# Patient Record
Sex: FEMALE | HISPANIC OR LATINO | Employment: FULL TIME | URBAN - METROPOLITAN AREA
[De-identification: names, ages, dates, MRNs, and addresses within clinical notes are randomized per-mention and may not be internally consistent; named-entity substitution may affect disease eponyms.]

---
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
## 2023-02-28 ENCOUNTER — OFFICE VISIT (OUTPATIENT)
Dept: URGENT CARE | Facility: CLINIC | Age: 18
End: 2023-02-28
Payer: MEDICAID

## 2023-02-28 ENCOUNTER — HOSPITAL ENCOUNTER (EMERGENCY)
Facility: MEDICAL CENTER | Age: 18
End: 2023-02-28
Attending: EMERGENCY MEDICINE
Payer: MEDICAID

## 2023-02-28 VITALS
OXYGEN SATURATION: 99 % | RESPIRATION RATE: 17 BRPM | TEMPERATURE: 98.8 F | DIASTOLIC BLOOD PRESSURE: 82 MMHG | WEIGHT: 229.94 LBS | HEART RATE: 109 BPM | HEIGHT: 66 IN | SYSTOLIC BLOOD PRESSURE: 121 MMHG | BODY MASS INDEX: 36.95 KG/M2

## 2023-02-28 VITALS
HEART RATE: 131 BPM | TEMPERATURE: 98 F | OXYGEN SATURATION: 98 % | DIASTOLIC BLOOD PRESSURE: 74 MMHG | RESPIRATION RATE: 14 BRPM | BODY MASS INDEX: 38.15 KG/M2 | HEIGHT: 65 IN | WEIGHT: 229 LBS | SYSTOLIC BLOOD PRESSURE: 126 MMHG

## 2023-02-28 DIAGNOSIS — R00.0 TACHYCARDIA: ICD-10-CM

## 2023-02-28 DIAGNOSIS — R42 DIZZINESS: ICD-10-CM

## 2023-02-28 DIAGNOSIS — N92.6 ABNORMAL MENSES: ICD-10-CM

## 2023-02-28 DIAGNOSIS — R10.9 ABDOMINAL CRAMPS: ICD-10-CM

## 2023-02-28 LAB
ALBUMIN SERPL BCP-MCNC: 4.4 G/DL (ref 3.2–4.9)
ALBUMIN/GLOB SERPL: 1.4 G/DL
ALP SERPL-CCNC: 81 U/L (ref 30–99)
ALT SERPL-CCNC: 156 U/L (ref 2–50)
ANION GAP SERPL CALC-SCNC: 14 MMOL/L (ref 7–16)
APPEARANCE UR: CLEAR
APPEARANCE UR: CLEAR
AST SERPL-CCNC: 63 U/L (ref 12–45)
BASOPHILS # BLD AUTO: 0.5 % (ref 0–1.8)
BASOPHILS # BLD: 0.05 K/UL (ref 0–0.12)
BILIRUB SERPL-MCNC: 0.7 MG/DL (ref 0.1–1.5)
BILIRUB UR QL STRIP.AUTO: NEGATIVE
BILIRUB UR STRIP-MCNC: NEGATIVE MG/DL
BUN SERPL-MCNC: 14 MG/DL (ref 8–22)
CALCIUM ALBUM COR SERPL-MCNC: 8.6 MG/DL (ref 8.5–10.5)
CALCIUM SERPL-MCNC: 8.9 MG/DL (ref 8.5–10.5)
CHLORIDE SERPL-SCNC: 101 MMOL/L (ref 96–112)
CO2 SERPL-SCNC: 21 MMOL/L (ref 20–33)
COLOR UR AUTO: NORMAL
COLOR UR: YELLOW
CREAT SERPL-MCNC: 0.47 MG/DL (ref 0.5–1.4)
EOSINOPHIL # BLD AUTO: 0.22 K/UL (ref 0–0.51)
EOSINOPHIL NFR BLD: 2.2 % (ref 0–6.9)
ERYTHROCYTE [DISTWIDTH] IN BLOOD BY AUTOMATED COUNT: 39.4 FL (ref 35.9–50)
GFR SERPLBLD CREATININE-BSD FMLA CKD-EPI: 140 ML/MIN/1.73 M 2
GLOBULIN SER CALC-MCNC: 3.1 G/DL (ref 1.9–3.5)
GLUCOSE SERPL-MCNC: 118 MG/DL (ref 65–99)
GLUCOSE UR STRIP.AUTO-MCNC: NEGATIVE MG/DL
GLUCOSE UR STRIP.AUTO-MCNC: NEGATIVE MG/DL
HCG SERPL QL: NEGATIVE
HCT VFR BLD AUTO: 42 % (ref 37–47)
HGB BLD-MCNC: 14.7 G/DL (ref 12–16)
IMM GRANULOCYTES # BLD AUTO: 0.04 K/UL (ref 0–0.11)
IMM GRANULOCYTES NFR BLD AUTO: 0.4 % (ref 0–0.9)
KETONES UR STRIP.AUTO-MCNC: 15 MG/DL
KETONES UR STRIP.AUTO-MCNC: ABNORMAL MG/DL
LEUKOCYTE ESTERASE UR QL STRIP.AUTO: NEGATIVE
LEUKOCYTE ESTERASE UR QL STRIP.AUTO: NEGATIVE
LIPASE SERPL-CCNC: 14 U/L (ref 11–82)
LYMPHOCYTES # BLD AUTO: 0.95 K/UL (ref 1–4.8)
LYMPHOCYTES NFR BLD: 9.6 % (ref 22–41)
MCH RBC QN AUTO: 30.3 PG (ref 27–33)
MCHC RBC AUTO-ENTMCNC: 35 G/DL (ref 33.6–35)
MCV RBC AUTO: 86.6 FL (ref 81.4–97.8)
MICRO URNS: ABNORMAL
MONOCYTES # BLD AUTO: 0.67 K/UL (ref 0–0.85)
MONOCYTES NFR BLD AUTO: 6.8 % (ref 0–13.4)
NEUTROPHILS # BLD AUTO: 7.95 K/UL (ref 2–7.15)
NEUTROPHILS NFR BLD: 80.5 % (ref 44–72)
NITRITE UR QL STRIP.AUTO: NEGATIVE
NITRITE UR QL STRIP.AUTO: NEGATIVE
NRBC # BLD AUTO: 0 K/UL
NRBC BLD-RTO: 0 /100 WBC
PH UR STRIP.AUTO: 6 [PH] (ref 5–8)
PH UR STRIP.AUTO: 6 [PH] (ref 5–8)
PLATELET # BLD AUTO: 307 K/UL (ref 164–446)
PMV BLD AUTO: 9.8 FL (ref 9–12.9)
POCT INT CON NEG: NEGATIVE
POCT INT CON POS: POSITIVE
POCT URINE PREGNANCY TEST: NEGATIVE
POTASSIUM SERPL-SCNC: 4 MMOL/L (ref 3.6–5.5)
PROT SERPL-MCNC: 7.5 G/DL (ref 6–8.2)
PROT UR QL STRIP: NEGATIVE MG/DL
PROT UR QL STRIP: NEGATIVE MG/DL
RBC # BLD AUTO: 4.85 M/UL (ref 4.2–5.4)
RBC UR QL AUTO: NEGATIVE
RBC UR QL AUTO: NEGATIVE
SODIUM SERPL-SCNC: 136 MMOL/L (ref 135–145)
SP GR UR STRIP.AUTO: 1.02
SP GR UR STRIP.AUTO: 1.02
UROBILINOGEN UR STRIP-MCNC: 0.2 MG/DL
UROBILINOGEN UR STRIP.AUTO-MCNC: 0.2 MG/DL
WBC # BLD AUTO: 9.9 K/UL (ref 4.8–10.8)

## 2023-02-28 PROCEDURE — 36415 COLL VENOUS BLD VENIPUNCTURE: CPT

## 2023-02-28 PROCEDURE — 80053 COMPREHEN METABOLIC PANEL: CPT

## 2023-02-28 PROCEDURE — 99203 OFFICE O/P NEW LOW 30 MIN: CPT | Performed by: NURSE PRACTITIONER

## 2023-02-28 PROCEDURE — 700105 HCHG RX REV CODE 258: Performed by: EMERGENCY MEDICINE

## 2023-02-28 PROCEDURE — 85025 COMPLETE CBC W/AUTO DIFF WBC: CPT

## 2023-02-28 PROCEDURE — 83690 ASSAY OF LIPASE: CPT

## 2023-02-28 PROCEDURE — 84703 CHORIONIC GONADOTROPIN ASSAY: CPT

## 2023-02-28 PROCEDURE — 81003 URINALYSIS AUTO W/O SCOPE: CPT

## 2023-02-28 PROCEDURE — 96374 THER/PROPH/DIAG INJ IV PUSH: CPT

## 2023-02-28 PROCEDURE — 99285 EMERGENCY DEPT VISIT HI MDM: CPT

## 2023-02-28 PROCEDURE — 81025 URINE PREGNANCY TEST: CPT | Performed by: NURSE PRACTITIONER

## 2023-02-28 PROCEDURE — 81002 URINALYSIS NONAUTO W/O SCOPE: CPT | Performed by: NURSE PRACTITIONER

## 2023-02-28 PROCEDURE — 700111 HCHG RX REV CODE 636 W/ 250 OVERRIDE (IP): Performed by: EMERGENCY MEDICINE

## 2023-02-28 RX ORDER — ONDANSETRON 4 MG/1
4 TABLET, ORALLY DISINTEGRATING ORAL ONCE
Status: COMPLETED | OUTPATIENT
Start: 2023-02-28 | End: 2023-02-28

## 2023-02-28 RX ORDER — SODIUM CHLORIDE 9 MG/ML
1000 INJECTION, SOLUTION INTRAVENOUS ONCE
Status: COMPLETED | OUTPATIENT
Start: 2023-02-28 | End: 2023-02-28

## 2023-02-28 RX ORDER — PROCHLORPERAZINE EDISYLATE 5 MG/ML
10 INJECTION INTRAMUSCULAR; INTRAVENOUS ONCE
Status: COMPLETED | OUTPATIENT
Start: 2023-02-28 | End: 2023-02-28

## 2023-02-28 RX ADMIN — ONDANSETRON 4 MG: 4 TABLET, ORALLY DISINTEGRATING ORAL at 17:16

## 2023-02-28 RX ADMIN — PROCHLORPERAZINE EDISYLATE 10 MG: 5 INJECTION INTRAMUSCULAR; INTRAVENOUS at 19:30

## 2023-02-28 RX ADMIN — SODIUM CHLORIDE 1000 ML: 9 INJECTION, SOLUTION INTRAVENOUS at 18:19

## 2023-02-28 RX ADMIN — SODIUM CHLORIDE 1000 ML: 9 INJECTION, SOLUTION INTRAVENOUS at 17:17

## 2023-02-28 ASSESSMENT — PAIN DESCRIPTION - PAIN TYPE: TYPE: ACUTE PAIN

## 2023-02-28 NOTE — ED NOTES
Pt ambulatory to yellow 58 with steady gait. Pt changed into gown and placed on monitor. Chart up for ERP.

## 2023-02-28 NOTE — ED TRIAGE NOTES
Chief Complaint   Patient presents with    Sent by MD     Pt was sent from  for dizziness, numbness in hands, abdominal cramping, and nausea x1 month. Pt states she has a Hx of iron deficiency anemia.     Pt educated upon triage process and told to inform  staff of any changes in condition so that Pt may be reassessed. No further questions at this time. Pt sitting out in lobby.

## 2023-02-28 NOTE — PROGRESS NOTES
Chief Complaint   Patient presents with    Abdominal Pain     Abd pain/cramps, hands numb, dizziness, nausea, H/O anemia, seeing pcp 3/29/2023       HISTORY OF PRESENT ILLNESS: Patient is a pleasant 17 y.o. female, with a history of anemia, who presents to urgent care today with complaints of dizziness.  She had an episode of dizziness along with hand cramping this morning.  She has had intermittent dizziness and abdominal cramps over the past month which have been worsening.  She notes history of abnormal menses with concern of anemia.  She has not been worked up for such.    There are no problems to display for this patient.      Allergies:Patient has no known allergies.    No current Epic-ordered outpatient medications on file.     No current Epic-ordered facility-administered medications on file.       History reviewed. No pertinent past medical history.    Social History     Tobacco Use    Smoking status: Never    Smokeless tobacco: Never   Vaping Use    Vaping Use: Never used   Substance Use Topics    Alcohol use: Never    Drug use: Never       No family status information on file.   History reviewed. No pertinent family history.    ROS:  Review of Systems   Constitutional: Negative for fever, chills, weight loss, malaise, and fatigue.   HENT: Negative for ear pain, nosebleeds, congestion, sore throat and neck pain.    Eyes: Negative for vision changes.   Neuro: Positive for dizziness.  Negative for headache, sensory changes, weakness, seizure, LOC.   Cardiovascular: Negative for chest pain, palpitations, orthopnea and leg swelling.   Respiratory: Negative for cough, sputum production, shortness of breath and wheezing.   Gastrointestinal: Positive for abdominal cramping, nausea.   Negative for vomiting or diarrhea.   Genitourinary: Negative for dysuria, urgency and frequency.  Musculoskeletal:   Positive for hand cramping.  Negative for falls, neck pain, back pain, joint pain, myalgias.   Skin: Negative for  "rash, diaphoresis.     Exam:  /74   Pulse (!) 131   Temp 36.7 °C (98 °F) (Temporal)   Resp 14   Ht 1.651 m (5' 5\")   Wt 104 kg (229 lb)   SpO2 98%   General: well-nourished, well-developed female in NAD  Head: normocephalic, atraumatic  Eyes: PERRLA, no conjunctival injection, acuity grossly intact, lids normal.  Ears: normal shape and symmetry, no tenderness, no discharge. External canals are without any significant edema or erythema. Tympanic membranes are without any inflammation, no effusion. Gross auditory acuity is intact.  Nose: symmetrical without tenderness, no discharge.  Mouth/Throat: reasonable hygiene, no erythema, exudates or tonsillar enlargement.  Neck: no masses, range of motion within normal limits, no tracheal deviation. No obvious thyroid enlargement.   Lymph: no cervical adenopathy. No supraclavicular adenopathy.   Neuro: alert and oriented. Cranial nerves 1-12 grossly intact. No sensory deficit.   Cardiovascular: Tachycardic rate and regular rhythm. No edema.  Pulmonary: no distress. Chest is symmetrical with respiration, no wheezes, crackles, or rhonchi.   Abdomen: soft, no distinct tenderness, no guarding, no hepatosplenomegaly.  Musculoskeletal: no clubbing, appropriate muscle tone, gait is stable.  Skin: warm, dry, intact, no clubbing, no cyanosis, no rashes.   Psych: appropriate mood, affect, judgement.       POC urine positive for ketones    POC pregnancy negative      Assessment/Plan:  1. Dizziness        2. Tachycardia        3. Abdominal cramps  POCT Urinalysis    POCT Pregnancy      4. Abnormal menses              The patient is a 17-year-old female who presents with a sudden episode of dizziness and hand tingling today.  She notes she has had been having intermittent dizziness and nausea along with abdominal cramping and abnormal menses over the last month, worsening recently.  Urine negative today for pregnancy.  Upon examination patient is tachycardic at 131. " Differential diagnoses include but are not limited to: Anemia, abnormal uterine bleeding, electrolyte imbalance.  At this time, I feel the patient requires a higher level of care in the ED for closer monitoring, stat lab work and/or imaging for further evaluation. This has been discussed with the patient and she states agreement and understanding. The patient is stable to leave POV at this time and will go directly to ED without delay. Instructed to remain NPO, patient has a ride.         Please note that this dictation was created using voice recognition software. I have made every reasonable attempt to correct obvious errors, but I expect that there are errors of grammar and possibly content that I did not discover before finalizing the note.      HUGO Patrick.

## 2023-02-28 NOTE — Clinical Note
Katie Demarco was seen and treated in our emergency department on 2/28/2023.  She may return to work on 03/02/2023.       If you have any questions or concerns, please don't hesitate to call.      Alejo Rivera D.O.

## 2023-03-01 NOTE — ED PROVIDER NOTES
"ED Provider Note    CHIEF COMPLAINT  Chief Complaint   Patient presents with    Sent by MD     Pt was sent from  for dizziness, numbness in hands, abdominal cramping, and nausea x1 month. Pt states she has a Hx of iron deficiency anemia.       EXTERNAL RECORDS REVIEWED  None    HPI/ROS  LIMITATION TO HISTORY   None  OUTSIDE HISTORIAN(S):  None    Katie Demarco is a 17 y.o. female who presents here for evaluation of dizziness and upper abdominal cramping.  Patient states that her cramping is around her entire abdomen, consistent with her menses cramping.  She has no fever chills, but does complain of some vomiting earlier today.  She last threw up around 11:30 AM.  She states that she is able to drink fluids since that time.  She had some bilateral hand numbness and tingling, that is since resolved.  She states that she is worse when she gets up, and that her dizziness happens.  It does then resolve after she lays back down.    PAST MEDICAL HISTORY   No bleeding disorders    SURGICAL HISTORY  patient denies any surgical history    FAMILY HISTORY  No family history on file.    SOCIAL HISTORY  Social History     Tobacco Use    Smoking status: Never    Smokeless tobacco: Never   Vaping Use    Vaping Use: Never used   Substance and Sexual Activity    Alcohol use: Never    Drug use: Never    Sexual activity: Not on file       CURRENT MEDICATIONS  Home Medications    **Home medications have not yet been reviewed for this encounter**         ALLERGIES  No Known Allergies    PHYSICAL EXAM  VITAL SIGNS: /82   Pulse (!) 109   Temp 37.1 °C (98.8 °F) (Temporal)   Resp 17   Ht 1.676 m (5' 6\")   Wt 104 kg (229 lb 15 oz)   LMP 02/21/2023 (Approximate)   SpO2 99%   BMI 37.11 kg/m²    Constitutional: Well developed, well nourished. No acute distress.  HEENT: Normocephalic, atraumatic. Posterior pharynx clear and moist.  Eyes:  EOMI. Normal sclera.  Neck: Supple, Full range of motion, " nontender.  Chest/Pulmonary: clear to ausculation. Symmetrical expansion.   Cardio: Regular rate and rhythm with no murmur.   Abdomen: Soft, nontender. No peritoneal signs. No guarding. No palpable masses.  Musculoskeletal: No deformity, no edema, neurovascular intact.   Neuro: Clear speech, appropriate, cooperative, cranial nerves II-XII grossly intact.  Psych: Normal mood and affect      DIAGNOSTIC STUDIES / PROCEDURES  Results for orders placed or performed during the hospital encounter of 02/28/23   CBC WITH DIFFERENTIAL   Result Value Ref Range    WBC 9.9 4.8 - 10.8 K/uL    RBC 4.85 4.20 - 5.40 M/uL    Hemoglobin 14.7 12.0 - 16.0 g/dL    Hematocrit 42.0 37.0 - 47.0 %    MCV 86.6 81.4 - 97.8 fL    MCH 30.3 27.0 - 33.0 pg    MCHC 35.0 33.6 - 35.0 g/dL    RDW 39.4 35.9 - 50.0 fL    Platelet Count 307 164 - 446 K/uL    MPV 9.8 9.0 - 12.9 fL    Neutrophils-Polys 80.50 (H) 44.00 - 72.00 %    Lymphocytes 9.60 (L) 22.00 - 41.00 %    Monocytes 6.80 0.00 - 13.40 %    Eosinophils 2.20 0.00 - 6.90 %    Basophils 0.50 0.00 - 1.80 %    Immature Granulocytes 0.40 0.00 - 0.90 %    Nucleated RBC 0.00 /100 WBC    Neutrophils (Absolute) 7.95 (H) 2.00 - 7.15 K/uL    Lymphs (Absolute) 0.95 (L) 1.00 - 4.80 K/uL    Monos (Absolute) 0.67 0.00 - 0.85 K/uL    Eos (Absolute) 0.22 0.00 - 0.51 K/uL    Baso (Absolute) 0.05 0.00 - 0.12 K/uL    Immature Granulocytes (abs) 0.04 0.00 - 0.11 K/uL    NRBC (Absolute) 0.00 K/uL   COMP METABOLIC PANEL   Result Value Ref Range    Sodium 136 135 - 145 mmol/L    Potassium 4.0 3.6 - 5.5 mmol/L    Chloride 101 96 - 112 mmol/L    Co2 21 20 - 33 mmol/L    Anion Gap 14.0 7.0 - 16.0    Glucose 118 (H) 65 - 99 mg/dL    Bun 14 8 - 22 mg/dL    Creatinine 0.47 (L) 0.50 - 1.40 mg/dL    Calcium 8.9 8.5 - 10.5 mg/dL    AST(SGOT) 63 (H) 12 - 45 U/L    ALT(SGPT) 156 (H) 2 - 50 U/L    Alkaline Phosphatase 81 30 - 99 U/L    Total Bilirubin 0.7 0.1 - 1.5 mg/dL    Albumin 4.4 3.2 - 4.9 g/dL    Total Protein 7.5 6.0 - 8.2  g/dL    Globulin 3.1 1.9 - 3.5 g/dL    A-G Ratio 1.4 g/dL   LIPASE   Result Value Ref Range    Lipase 14 11 - 82 U/L   HCG QUAL SERUM   Result Value Ref Range    Beta-Hcg Qualitative Serum Negative Negative   URINALYSIS,CULTURE IF INDICATED    Specimen: Urine   Result Value Ref Range    Color Yellow     Character Clear     Specific Gravity 1.018 <1.035    Ph 6.0 5.0 - 8.0    Glucose Negative Negative mg/dL    Ketones Trace (A) Negative mg/dL    Protein Negative Negative mg/dL    Bilirubin Negative Negative    Urobilinogen, Urine 0.2 Negative    Nitrite Negative Negative    Leukocyte Esterase Negative Negative    Occult Blood Negative Negative    Micro Urine Req see below    CORRECTED CALCIUM   Result Value Ref Range    Correct Calcium 8.6 8.5 - 10.5 mg/dL   ESTIMATED GFR   Result Value Ref Range    GFR (CKD-EPI) 140 >60 mL/min/1.73 m 2         RADIOLOGY          COURSE & MEDICAL DECISION MAKING      INITIAL ASSESSMENT, COURSE AND PLAN  Care Narrative: This is a 17-year-old female here for evaluation of dizziness.  Patient states that she has had a day of vomiting, and states that every time she gets up she becomes nauseated and starts to vomit.  She has been worked up here, blood work wise comfortable going home.  She has been given 2 L of IV fluid, and she was positive for orthostatics.  Patient does feel better, and has been up walking around.  She is comfortable with outpatient follow-up.    DISPOSITION AND DISCUSSIONS  I have discussed management of the patient with the following physicians and ROLANDO's:  none    Discussion of management with other QHP or appropriate source(s): None     Escalation of care considered, and ultimately not performed:none    Barriers to care at this time, including but not limited to: Patient does not have established PCP.     Decision tools and prescription drugs considered including, but not limited to: .    FINAL DIAGNOSIS  1. Dizziness           Electronically signed by: Alejo LURICH  HUI Rivera, 2/28/2023 4:25 PM

## 2023-03-01 NOTE — ED NOTES
Pt ambulated to the restroom with steady gait and without assistance. Returned to bed and re-placed on monitors. No further complaints at this time.

## 2024-02-21 ENCOUNTER — OFFICE VISIT (OUTPATIENT)
Dept: URGENT CARE | Facility: CLINIC | Age: 19
End: 2024-02-21
Payer: MEDICAID

## 2024-02-21 VITALS
WEIGHT: 174 LBS | OXYGEN SATURATION: 98 % | BODY MASS INDEX: 28.99 KG/M2 | HEIGHT: 65 IN | SYSTOLIC BLOOD PRESSURE: 108 MMHG | DIASTOLIC BLOOD PRESSURE: 60 MMHG | HEART RATE: 102 BPM | TEMPERATURE: 97.4 F | RESPIRATION RATE: 20 BRPM

## 2024-02-21 DIAGNOSIS — J02.9 PHARYNGITIS, UNSPECIFIED ETIOLOGY: ICD-10-CM

## 2024-02-21 DIAGNOSIS — J06.9 VIRAL URI WITH COUGH: ICD-10-CM

## 2024-02-21 LAB
FLUAV RNA SPEC QL NAA+PROBE: NEGATIVE
FLUBV RNA SPEC QL NAA+PROBE: NEGATIVE
RSV RNA SPEC QL NAA+PROBE: NEGATIVE
SARS-COV-2 RNA RESP QL NAA+PROBE: NEGATIVE

## 2024-02-21 PROCEDURE — 99213 OFFICE O/P EST LOW 20 MIN: CPT | Performed by: PHYSICIAN ASSISTANT

## 2024-02-21 PROCEDURE — 3078F DIAST BP <80 MM HG: CPT | Performed by: PHYSICIAN ASSISTANT

## 2024-02-21 PROCEDURE — 3074F SYST BP LT 130 MM HG: CPT | Performed by: PHYSICIAN ASSISTANT

## 2024-02-21 PROCEDURE — 87637 SARSCOV2&INF A&B&RSV AMP PRB: CPT | Mod: QW | Performed by: PHYSICIAN ASSISTANT

## 2024-02-21 RX ORDER — DEXTROMETHORPHAN HYDROBROMIDE AND PROMETHAZINE HYDROCHLORIDE 15; 6.25 MG/5ML; MG/5ML
5 SYRUP ORAL EVERY 4 HOURS PRN
Qty: 120 ML | Refills: 0 | Status: SHIPPED | OUTPATIENT
Start: 2024-02-21 | End: 2024-03-19

## 2024-02-21 ASSESSMENT — ENCOUNTER SYMPTOMS
COUGH: 1
CHILLS: 1
WHEEZING: 0
NAUSEA: 0
HEADACHES: 1
SPUTUM PRODUCTION: 0
SHORTNESS OF BREATH: 0
MYALGIAS: 1
VOMITING: 0
FEVER: 1
DIARRHEA: 0
ABDOMINAL PAIN: 0

## 2024-02-21 ASSESSMENT — FIBROSIS 4 INDEX: FIB4 SCORE: 0.33

## 2024-02-21 NOTE — LETTER
[facility] [place] [id number] [facility] [id number]     February 21, 2024    Patient: Katie Demarco   YOB: 2005   Date of Visit: 2/21/2024       To Whom It May Concern:    Katie Demarco was seen and treated in our department on 2/21/2024.  She should be excused from school for today and tomorrow.    Sincerely,     Bharat Donis P.A.-C.

## 2024-02-21 NOTE — PROGRESS NOTES
"Subjective:   Katie Demarco  is a 18 y.o. female who presents for Fever (On and off fevers since yesterday ), Otalgia, Pharyngitis, Headache (Started symptoms yesterday ), and Cough      Fever   This is a new problem. The current episode started yesterday. Associated symptoms include congestion, coughing, ear pain and headaches. Pertinent negatives include no abdominal pain, diarrhea, nausea, rash, vomiting or wheezing.   Otalgia   Associated symptoms include coughing and headaches. Pertinent negatives include no abdominal pain, diarrhea, rash or vomiting.   Pharyngitis   Associated symptoms include congestion, coughing, ear pain and headaches. Pertinent negatives include no abdominal pain, diarrhea, shortness of breath or vomiting.   Headache  Cough  Associated symptoms include chills, ear pain, a fever, headaches and myalgias. Pertinent negatives include no rash, shortness of breath or wheezing.   Patient presents urgent care noting yesterday with subjective fever onset.  Notes some coughing that irritated throat as well.  Denies ear pain.  Denies vomiting abdominal pain or diarrhea.  Denies history of asthma pneumonia or having tested positive for COVID.  She presume she has had COVID in the past with mild symptoms.  She has tried treatment with an OTC fever reducer.    Review of Systems   Constitutional:  Positive for chills and fever.   HENT:  Positive for congestion and ear pain.    Respiratory:  Positive for cough. Negative for sputum production, shortness of breath and wheezing.    Gastrointestinal:  Negative for abdominal pain, diarrhea, nausea and vomiting.   Musculoskeletal:  Positive for myalgias.   Skin:  Negative for rash.   Neurological:  Positive for headaches.       No Known Allergies     Objective:   /60 (BP Location: Left arm, Patient Position: Sitting)   Pulse (!) 102   Temp 36.3 °C (97.4 °F) (Temporal)   Resp 20   Ht 1.651 m (5' 5\")   Wt 78.9 kg (174 lb)   SpO2 98%   BMI " 28.96 kg/m²     Physical Exam  Vitals and nursing note reviewed.   Constitutional:       General: She is not in acute distress.     Appearance: She is well-developed. She is not diaphoretic.   HENT:      Head: Normocephalic and atraumatic.      Right Ear: Tympanic membrane, ear canal and external ear normal.      Left Ear: Tympanic membrane, ear canal and external ear normal.      Nose: Nose normal.      Mouth/Throat:      Mouth: Mucous membranes are moist.      Pharynx: Uvula midline. Posterior oropharyngeal erythema ( mild PND) present. No oropharyngeal exudate.      Tonsils: No tonsillar abscesses.   Eyes:      General: Lids are normal. No scleral icterus.        Right eye: No discharge.         Left eye: No discharge.      Conjunctiva/sclera: Conjunctivae normal.   Pulmonary:      Effort: Pulmonary effort is normal. No respiratory distress.      Breath sounds: Normal breath sounds. No stridor. No decreased breath sounds, wheezing, rhonchi or rales.   Musculoskeletal:         General: Normal range of motion.      Cervical back: Neck supple.   Skin:     General: Skin is warm and dry.      Coloration: Skin is not pale.      Findings: No erythema.   Neurological:      Mental Status: She is alert and oriented to person, place, and time. She is not disoriented.   Psychiatric:         Speech: Speech normal.         Behavior: Behavior normal.     Point-of-care test for RSV influenza and COVID is all negative    Assessment/Plan:   1. Viral URI with cough  - POCT CoV-2, Flu A/B, RSV by PCR  - promethazine-dextromethorphan (PROMETHAZINE-DM) 6.25-15 MG/5ML syrup; Take 5 mL by mouth every four hours as needed for Cough.  Dispense: 120 mL; Refill: 0    2. Pharyngitis, unspecified etiology  - POCT CoV-2, Flu A/B, RSV by PCR  Supportive care is reviewed with patient/caregiver - recommend to push PO fluids and electrolytes, Cautioned regarding potential for sedation with medication.  OTC supportive care measures reviewed  Return  to clinic with lack of resolution or progression of symptoms.      I have worn an N95 mask, gloves and eye protection for the entire encounter with this patient.     Differential diagnosis, natural history, supportive care, and indications for immediate follow-up discussed.

## 2024-02-29 ENCOUNTER — OFFICE VISIT (OUTPATIENT)
Dept: URGENT CARE | Facility: CLINIC | Age: 19
End: 2024-02-29
Payer: MEDICAID

## 2024-02-29 VITALS
WEIGHT: 175 LBS | OXYGEN SATURATION: 98 % | SYSTOLIC BLOOD PRESSURE: 100 MMHG | TEMPERATURE: 98 F | HEIGHT: 65 IN | HEART RATE: 88 BPM | RESPIRATION RATE: 18 BRPM | DIASTOLIC BLOOD PRESSURE: 68 MMHG | BODY MASS INDEX: 29.16 KG/M2

## 2024-02-29 DIAGNOSIS — J01.90 ACUTE BACTERIAL RHINOSINUSITIS: ICD-10-CM

## 2024-02-29 DIAGNOSIS — B96.89 ACUTE BACTERIAL RHINOSINUSITIS: ICD-10-CM

## 2024-02-29 PROCEDURE — 99213 OFFICE O/P EST LOW 20 MIN: CPT | Performed by: STUDENT IN AN ORGANIZED HEALTH CARE EDUCATION/TRAINING PROGRAM

## 2024-02-29 PROCEDURE — 3078F DIAST BP <80 MM HG: CPT | Performed by: STUDENT IN AN ORGANIZED HEALTH CARE EDUCATION/TRAINING PROGRAM

## 2024-02-29 PROCEDURE — 3074F SYST BP LT 130 MM HG: CPT | Performed by: STUDENT IN AN ORGANIZED HEALTH CARE EDUCATION/TRAINING PROGRAM

## 2024-02-29 RX ORDER — FLUTICASONE PROPIONATE 50 MCG
2 SPRAY, SUSPENSION (ML) NASAL
Qty: 16 G | Refills: 1 | Status: SHIPPED | OUTPATIENT
Start: 2024-02-29 | End: 2024-03-19

## 2024-02-29 RX ORDER — AMOXICILLIN AND CLAVULANATE POTASSIUM 875; 125 MG/1; MG/1
1 TABLET, FILM COATED ORAL 2 TIMES DAILY
Qty: 10 TABLET | Refills: 0 | Status: SHIPPED | OUTPATIENT
Start: 2024-02-29 | End: 2024-03-05

## 2024-02-29 RX ORDER — CETIRIZINE HYDROCHLORIDE 10 MG/1
10 TABLET ORAL DAILY
Qty: 30 TABLET | Refills: 1 | Status: SHIPPED | OUTPATIENT
Start: 2024-02-29 | End: 2024-03-19

## 2024-02-29 ASSESSMENT — FIBROSIS 4 INDEX: FIB4 SCORE: 0.33

## 2024-02-29 NOTE — LETTER
March 1, 2024       Patient: Katie Demarco   YOB: 2005   Date of Visit: 2/29/2024         To Whom It May Concern:    Katie Demarco is excused from school yesterday and today.    If you have any questions or concerns, please don't hesitate to call [phone]          Sincerely,          Clemente Pascal D.O.  Electronically Signed

## 2024-02-29 NOTE — LETTER
February 29, 2024       Patient: Katie Demarco   YOB: 2005   Date of Visit: 2/29/2024         To Whom It May Concern:     Katie Demarco was seen in urgent care and is excuse from school today.    If you have any questions or concerns, please don't hesitate to call [phone]          Sincerely,          Clemente Pascal D.O.  Electronically Signed

## 2024-02-29 NOTE — PROGRESS NOTES
"Subjective:   CHIEF COMPLAINT  Chief Complaint   Patient presents with    Cough     X 2 weeks on and off, cough from \"irritated throat,\" runny nose, eye boogers, red/irritated eyes       [facility]  Katie Demarco is a 18 y.o. female who presents with a chief complaint of sinus congestion for approximately 2 weeks.  Seen in urgent care at onset and diagnosed with a viral URI.  Says congestion has not improved and is now having difficulty sleeping at night due to the congestion.  She has tried cough syrups which have not helped.  Then last night she developed some watery discharge from her eyes and woke up this morning with her eyes matted shut.  Eyes are little red and slightly irritated.  She does not wear contacts.  She has not had any fevers.  No wheezing.  No history of asthma.  She is accompanied by her friend.  Requesting a note for school.    REVIEW OF SYSTEMS  General: no fever or chills  GI: no nausea or vomiting  See HPI for further details.    PAST MEDICAL HISTORY  There are no problems to display for this patient.      SURGICAL HISTORY  patient denies any surgical history    ALLERGIES  No Known Allergies    CURRENT MEDICATIONS  Home Medications    **Home medications have not yet been reviewed for this encounter**         SOCIAL HISTORY  Social History     Tobacco Use    Smoking status: Never    Smokeless tobacco: Never   Vaping Use    Vaping Use: Never used   Substance and Sexual Activity    Alcohol use: Never    Drug use: Never    Sexual activity: Not on file       FAMILY HISTORY  No family history on file.       Objective:   PHYSICAL EXAM  VITAL SIGNS: /68   Pulse 88   Temp 36.7 °C (98 °F) (Temporal)   Resp 18   Ht 1.651 m (5' 5\")   Wt 79.4 kg (175 lb)   SpO2 98%   BMI 29.12 kg/m²     Gen: no acute distress, normal voice  Skin: dry, intact, moist mucosal membranes  Eyes: Mild conjunctival injection b/l; r > l   Neck: Normal range of motion. No meningeal signs.   ENT: Minimal bilateral " tonsillar exudates without any erythema or edema.  Uvula midline.  Lungs: No increased work of breathing.  CTAB w/ symmetric expansion  CV: RRR w/o murmurs or clicks  Psych: normal affect, normal judgement, alert, awake    Assessment/Plan:     1. Acute bacterial rhinosinusitis  amoxicillin-clavulanate (AUGMENTIN) 875-125 MG Tab    fluticasone (FLONASE) 50 MCG/ACT nasal spray    cetirizine (ZYRTEC) 10 MG Tab      History consistent with bacterial sinus infection.  -Ordered Augmentin  -Ordered Flonase and Zyrtec  -Return to urgent care any new/worsening symptoms or further questions or concerns.  Patient understood everything discussed.  All questions were answered.      Differential diagnosis and supportive care discussed. Follow-up as needed if symptoms worsen or fail to improve to PCP, Urgent care or Emergency Room.    Please note that this dictation was created using voice recognition software. I have made a reasonable attempt to correct obvious errors, but I expect that there are errors of grammar and possibly content that I did not discover before finalizing the note.

## 2024-03-19 ENCOUNTER — APPOINTMENT (OUTPATIENT)
Dept: RADIOLOGY | Facility: IMAGING CENTER | Age: 19
End: 2024-03-19
Attending: NURSE PRACTITIONER
Payer: MEDICAID

## 2024-03-19 ENCOUNTER — OFFICE VISIT (OUTPATIENT)
Dept: URGENT CARE | Facility: CLINIC | Age: 19
End: 2024-03-19
Payer: MEDICAID

## 2024-03-19 VITALS
RESPIRATION RATE: 14 BRPM | DIASTOLIC BLOOD PRESSURE: 68 MMHG | HEIGHT: 65 IN | WEIGHT: 177.8 LBS | BODY MASS INDEX: 29.62 KG/M2 | OXYGEN SATURATION: 99 % | SYSTOLIC BLOOD PRESSURE: 110 MMHG | TEMPERATURE: 96.8 F | HEART RATE: 93 BPM

## 2024-03-19 DIAGNOSIS — M65.9 SYNOVITIS OF ELBOW: ICD-10-CM

## 2024-03-19 DIAGNOSIS — M25.522 LEFT ELBOW PAIN: ICD-10-CM

## 2024-03-19 PROCEDURE — 99213 OFFICE O/P EST LOW 20 MIN: CPT | Performed by: NURSE PRACTITIONER

## 2024-03-19 PROCEDURE — 3074F SYST BP LT 130 MM HG: CPT | Performed by: NURSE PRACTITIONER

## 2024-03-19 PROCEDURE — 3078F DIAST BP <80 MM HG: CPT | Performed by: NURSE PRACTITIONER

## 2024-03-19 PROCEDURE — 73080 X-RAY EXAM OF ELBOW: CPT | Mod: TC,LT | Performed by: NURSE PRACTITIONER

## 2024-03-19 RX ORDER — MELOXICAM 7.5 MG/1
7.5 TABLET ORAL DAILY
Qty: 30 TABLET | Refills: 0 | Status: SHIPPED | OUTPATIENT
Start: 2024-03-19

## 2024-03-19 ASSESSMENT — FIBROSIS 4 INDEX: FIB4 SCORE: 0.35

## 2024-03-19 NOTE — PROGRESS NOTES
"Chief Complaint   Patient presents with    Elbow Pain     X2 weeks, no medicine working. Feels cramping pain at night and unable to sleep/or work. \" Feels like a tense pinching\".       HISTORY OF PRESENT ILLNESS: Patient is a pleasant 19 y.o. female who presents to urgent care today with complaints of elbow pain.  The patient notes that for the past 2 weeks she has had pain to her left elbow, primarily with extension.  She does feel some pain in her right elbow as well but not as severe.  Denies any numbness, tingling, weakness.  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  She has tried a few doses of NSAIDs without much improvement.    There are no problems to display for this patient.      Allergies:Patient has no known allergies.    No current [facility]-ordered outpatient medications on file.     No current [facility]-ordered facility-administered medications on file.       History reviewed. No pertinent past medical history.    Social History     Tobacco Use    Smoking status: Never    Smokeless tobacco: Never   Vaping Use    Vaping Use: Never used   Substance Use Topics    Alcohol use: Never    Drug use: Never       No family status information on file.   History reviewed. No pertinent family history.    ROS:  Review of Systems   Constitutional: Negative for fever, chills, weight loss, malaise, and fatigue.   HENT: Negative for ear pain, nosebleeds, congestion, sore throat and neck pain.    Eyes: Negative for vision changes.   Neuro: Negative for headache, sensory changes, weakness, seizure, LOC.   Cardiovascular: Negative for chest pain, palpitations, orthopnea and leg swelling.   Respiratory: Negative for cough, sputum production, shortness of breath and wheezing.   Gastrointestinal: Negative for abdominal pain, nausea, vomiting or diarrhea.   Genitourinary: Negative for dysuria, urgency and frequency.  Musculoskeletal: Positive for bilateral elbow pain, left greater than " "right.  Negative for falls, neck pain, back pain, myalgias.   Skin: Negative for rash, diaphoresis.     Exam:  /68 (BP Location: Left arm, Patient Position: Sitting, BP Cuff Size: Adult)   Pulse 93   Temp 36 °C (96.8 °F) (Temporal)   Resp 14   Ht 1.651 m (5' 5\")   Wt 80.6 kg (177 lb 12.8 oz)   SpO2 99%   General: well-nourished, well-developed female in NAD  Head: normocephalic, atraumatic  Eyes: PERRLA, no conjunctival injection, acuity grossly intact, lids normal.  Ears: normal shape and symmetry, no tenderness, no discharge. External canals are without any significant edema or erythema. Tympanic membranes are without any inflammation, no effusion. Gross auditory acuity is intact.  Nose: symmetrical without tenderness, no discharge.  Mouth/Throat: reasonable hygiene, no erythema, exudates or tonsillar enlargement.  Neck: no masses, range of motion within normal limits, no tracheal deviation. No obvious thyroid enlargement.   Lymph: no cervical adenopathy. No supraclavicular adenopathy.   Neuro: alert and oriented. Cranial nerves 1-12 grossly intact. No sensory deficit.   Cardiovascular: regular rate and rhythm. No edema.  Pulmonary: no distress. Chest is symmetrical with respiration, no wheezes, crackles, or rhonchi.   Musculoskeletal: no clubbing, appropriate muscle tone, gait is stable.  Bilateral elbows: Normal in appearance, nontender, without erythema or warmth.  Patient is able to extend right elbow with minimal pain, full extension possible.  Patient is also able to fully extend left elbow with moderate amount of pain, able to fully extend.  Bilateral extremity strength 5/5, neurovascular intact.  Skin: warm, dry, intact, no clubbing, no cyanosis, no rashes.   Psych: appropriate mood, affect, judgement.       Dx left elbow radiology reading \"Normal.\"      Assessment/Plan:  1. Synovitis of elbow  DX-ELBOW-COMPLETE 3+ LEFT    Referral to Physical Therapy    meloxicam (MOBIC) 7.5 MG Tab    "           Presentation consistent with synovitis of elbow, bilateral, but primarily left-sided.  Mobic as directed.  Referral placed to physical therapy for follow-up.  Instructed to ice and rest extremities.  Supportive care, differential diagnoses, and indications for immediate follow-up discussed with patient.   Pathogenesis of diagnosis discussed including typical length and natural progression.   Instructed to return to clinic or nearest emergency department for any change in condition, further concerns, or worsening of symptoms.  Patient states understanding of the plan of care and discharge instructions.  Instructed to make an appointment, for follow up, with her primary care provider.        Please note that this dictation was created using voice recognition software. I have made every reasonable attempt to correct obvious errors, but I expect that there are errors of grammar and possibly content that I did not discover before finalizing the note.      HUGO Patrick.

## 2024-03-19 NOTE — LETTER
March 19, 2024         Patient: Katie Demarco   YOB: 2005   Date of Visit: 3/19/2024           To Whom it May Concern:    Katie Demarco was seen in my clinic on 3/19/2024. She may be excused from school today, tomorrow and Thursday.     If you have any questions or concerns, please don't hesitate to call.        Sincerely,           HUGO Patrick.  Electronically Signed

## 2024-04-08 ENCOUNTER — OFFICE VISIT (OUTPATIENT)
Dept: URGENT CARE | Facility: PHYSICIAN GROUP | Age: 19
End: 2024-04-08
Payer: MEDICAID

## 2024-04-08 ENCOUNTER — APPOINTMENT (OUTPATIENT)
Dept: URGENT CARE | Facility: PHYSICIAN GROUP | Age: 19
End: 2024-04-08
Payer: MEDICAID

## 2024-04-08 VITALS
DIASTOLIC BLOOD PRESSURE: 74 MMHG | HEART RATE: 65 BPM | RESPIRATION RATE: 16 BRPM | SYSTOLIC BLOOD PRESSURE: 112 MMHG | WEIGHT: 179.4 LBS | HEIGHT: 65 IN | BODY MASS INDEX: 29.89 KG/M2 | TEMPERATURE: 96.5 F | OXYGEN SATURATION: 100 %

## 2024-04-08 DIAGNOSIS — G47.00 INSOMNIA, UNSPECIFIED TYPE: ICD-10-CM

## 2024-04-08 PROCEDURE — 3074F SYST BP LT 130 MM HG: CPT

## 2024-04-08 PROCEDURE — 3078F DIAST BP <80 MM HG: CPT

## 2024-04-08 PROCEDURE — 99213 OFFICE O/P EST LOW 20 MIN: CPT

## 2024-04-08 RX ORDER — HYDROXYZINE HYDROCHLORIDE 25 MG/1
25 TABLET, FILM COATED ORAL
Qty: 30 TABLET | Refills: 1 | Status: SHIPPED | OUTPATIENT
Start: 2024-04-08 | End: 2024-05-08

## 2024-04-08 ASSESSMENT — ENCOUNTER SYMPTOMS
VISUAL CHANGE: 0
NUMBNESS: 0
SORE THROAT: 0
INSOMNIA: 1
DIARRHEA: 0
HEADACHES: 1
COUGH: 0
PHOTOPHOBIA: 0
SHORTNESS OF BREATH: 0
MYALGIAS: 0
WEAKNESS: 0
DOUBLE VISION: 0
NECK PAIN: 0
VERTIGO: 0
DEPRESSION: 0
NAUSEA: 1
VOMITING: 0
DIZZINESS: 1
FEVER: 0
BLURRED VISION: 0
FATIGUE: 1
CHILLS: 0
NERVOUS/ANXIOUS: 1
ABDOMINAL PAIN: 0
ANOREXIA: 0

## 2024-04-08 ASSESSMENT — FIBROSIS 4 INDEX: FIB4 SCORE: 0.35

## 2024-04-08 NOTE — LETTER
[facility] [address] [address]     April 8, 2024    Patient: Katie Demarco   YOB: 2005   Date of Visit: 4/8/2024       To Whom It May Concern:    Katie Demarco was seen and treated in our department on 4/8/2024. Please excuse Patient for the next 2 days (4/9/2024-4/10/2024) due to recent illness.     Sincerely,     HUGO Colon.

## 2024-04-08 NOTE — PROGRESS NOTES
"Subjective:   Katie Demarco is a 19 y.o. female who presents for Headache, Dizziness (X2 days), Nausea, and Unable to Sleep (X5 days)      Other  This is a new problem. Episode onset: x5 days. The problem has been unchanged. Associated symptoms include fatigue, headaches and nausea. Pertinent negatives include no abdominal pain, anorexia, chest pain, chills, congestion, coughing, fever, myalgias, neck pain, numbness, rash, sore throat, vertigo, visual change, vomiting or weakness. She has tried nothing for the symptoms.       Review of Systems   Constitutional:  Positive for fatigue. Negative for chills, fever and malaise/fatigue.   HENT:  Negative for congestion, ear pain, hearing loss and sore throat.    Eyes:  Negative for blurred vision, double vision and photophobia.   Respiratory:  Negative for cough and shortness of breath.    Cardiovascular:  Negative for chest pain.   Gastrointestinal:  Positive for nausea. Negative for abdominal pain, anorexia, diarrhea and vomiting.   Genitourinary:  Negative for dysuria.   Musculoskeletal:  Negative for myalgias and neck pain.   Skin:  Negative for rash.   Neurological:  Positive for dizziness and headaches. Negative for vertigo, weakness and numbness.   Psychiatric/Behavioral:  Negative for depression. The patient is nervous/anxious and has insomnia.        Medications, Allergies, and current problem list reviewed today in Epic.     Objective:     /74 (BP Location: Right arm, Patient Position: Sitting, BP Cuff Size: Adult)   Pulse 65   Temp 35.8 °C (96.5 °F)   Resp 16   Ht 1.651 m (5' 5\")   Wt 81.4 kg (179 lb 6.4 oz)   SpO2 100%     Physical Exam  Constitutional:       General: She is not in acute distress.     Appearance: Normal appearance. She is not ill-appearing.   HENT:      Head: Normocephalic and atraumatic.      Right Ear: Tympanic membrane normal.      Left Ear: Tympanic membrane normal.      Nose: Nose normal.      Mouth/Throat:      " Mouth: Mucous membranes are moist.   Eyes:      Conjunctiva/sclera: Conjunctivae normal.   Cardiovascular:      Rate and Rhythm: Normal rate.      Heart sounds: Normal heart sounds.   Pulmonary:      Effort: Pulmonary effort is normal.   Abdominal:      General: Abdomen is flat.      Palpations: Abdomen is soft.   Musculoskeletal:         General: Normal range of motion.      Cervical back: Normal range of motion.   Skin:     General: Skin is warm and dry.      Capillary Refill: Capillary refill takes less than 2 seconds.   Neurological:      General: No focal deficit present.      Mental Status: She is alert and oriented to person, place, and time.      Sensory: No sensory deficit.      Motor: No weakness.      Gait: Gait normal.   Psychiatric:         Mood and Affect: Mood is anxious.         Behavior: Behavior normal.         Assessment/Plan:       1. Insomnia, unspecified type  hydrOXYzine HCl (ATARAX) 25 MG Tab        Patient is a pleasant 19-year-old female who is here today with significant other with complaints of insomnia for the past 5 days.  Patient reports that this is related to her anxiety.  Patient reports that her anxiety has been elevated recently due to stress of losing weight.  Patient does not take anything currently for anxiety and has not taken anything to help her sleep.  Patient does report that she has associated frontal headaches, intermittent dizziness, and mild nausea for the past 2 days.  Patient has no current symptoms at this time.  Patient reports that she has no trouble falling asleep but wakes up multiple times at night due to anxiety.  Patient denies any fevers, vomiting, vision changes, unilateral deficits, or depression.  After ROS and physical exam patient does not appear in any acute distress.  Patient provided instructions about how to manage anxiety at home.  Patient was prescribed hydroxyzine as needed at night for insomnia.  Patient told to take as prescribed.  Patient told  to continue with dieting and exercise to hopefully help with her anxiety.  Patient to monitor for any worsening signs and symptoms and if any other concerns to return to urgent care or emergency room for further management.      Differential diagnosis, natural history, and supportive care discussed. We also reviewed side effects of medication including allergic response, GI upset, tendon injury, rash, sedation etc. Patient and/or guardian voices understanding.      Advised the patient to follow-up with the primary care physician for recheck, reevaluation, and consideration of further management.    I personally reviewed prior external notes and test results pertinent to today's visit as well as additional imaging and testing completed in clinic today.     Please note that this dictation was created using voice recognition software. I have made every reasonable attempt to correct obvious errors, but I expect that there are errors of grammar and possibly content that I did not discover before finalizing the note.    This note was electronically signed by ENRICO Dean

## 2024-04-17 ENCOUNTER — OFFICE VISIT (OUTPATIENT)
Dept: URGENT CARE | Facility: CLINIC | Age: 19
End: 2024-04-17
Payer: MEDICAID

## 2024-04-17 VITALS
DIASTOLIC BLOOD PRESSURE: 64 MMHG | TEMPERATURE: 98.9 F | HEART RATE: 88 BPM | HEIGHT: 65 IN | WEIGHT: 175 LBS | RESPIRATION RATE: 16 BRPM | OXYGEN SATURATION: 98 % | BODY MASS INDEX: 29.16 KG/M2 | SYSTOLIC BLOOD PRESSURE: 100 MMHG

## 2024-04-17 DIAGNOSIS — K59.00 CONSTIPATION, UNSPECIFIED CONSTIPATION TYPE: ICD-10-CM

## 2024-04-17 DIAGNOSIS — K29.00 ACUTE GASTRITIS WITHOUT HEMORRHAGE, UNSPECIFIED GASTRITIS TYPE: ICD-10-CM

## 2024-04-17 PROCEDURE — 3078F DIAST BP <80 MM HG: CPT | Performed by: PHYSICIAN ASSISTANT

## 2024-04-17 PROCEDURE — 99213 OFFICE O/P EST LOW 20 MIN: CPT | Performed by: PHYSICIAN ASSISTANT

## 2024-04-17 PROCEDURE — 3074F SYST BP LT 130 MM HG: CPT | Performed by: PHYSICIAN ASSISTANT

## 2024-04-17 ASSESSMENT — ENCOUNTER SYMPTOMS
HEADACHES: 0
VOMITING: 0
BLOOD IN STOOL: 0
CONSTIPATION: 1
DIZZINESS: 1
PALPITATIONS: 0
FEVER: 0
DIARRHEA: 0
FLANK PAIN: 0
NAUSEA: 1
CHILLS: 0
ABDOMINAL PAIN: 1

## 2024-04-17 ASSESSMENT — FIBROSIS 4 INDEX: FIB4 SCORE: 0.35

## 2024-04-17 NOTE — PROGRESS NOTES
Subjective:   Katie Demarco is a 19 y.o. female who presents today with   Chief Complaint   Patient presents with    Dizziness     X Monday, HA, lightheaded, abd pain, nausea, bowel change x 2 days, nosebleed yesterday       Abdominal Pain  This is a new problem. Episode onset: 2 days. The onset quality is gradual. The problem occurs intermittently. The problem has been waxing and waning since onset. The pain is located in the epigastric region. The pain is mild. The quality of the pain is described as sharp. Associated symptoms include constipation and nausea. Pertinent negatives include no diarrhea, dysuria, fever, frequency, headaches, hematuria, melena or vomiting. Past treatments include nothing. The treatment provided no improvement relief.     Patient states symptoms seem to start after eating some spicy food on Sunday night, Chicharrones.  Symptoms seem to start after that into Monday morning and patient's significant other also had similar stomach upset.  Patient states she has been having bowel movements but they are very small and rather un relieving.    PMH:  has no past medical history on file.  MEDS:   Current Outpatient Medications:     hydrOXYzine HCl (ATARAX) 25 MG Tab, Take 1 Tablet by mouth at bedtime as needed for Anxiety for up to 30 days. (Patient not taking: Reported on 4/17/2024), Disp: 30 Tablet, Rfl: 1  ALLERGIES: No Known Allergies  SURGHX: No past surgical history on file.  SOCHX:  reports that she has never smoked. She has never used smokeless tobacco. She reports that she does not drink alcohol and does not use drugs.  FH: Reviewed with patient, not pertinent to this visit.     Review of Systems   Constitutional:  Negative for chills and fever.   Cardiovascular:  Negative for chest pain and palpitations.   Gastrointestinal:  Positive for abdominal pain, constipation and nausea. Negative for blood in stool, diarrhea, melena and vomiting.   Genitourinary:  Negative for  "dysuria, flank pain, frequency, hematuria and urgency.   Neurological:  Positive for dizziness. Negative for headaches.      Objective:   /64   Pulse 88   Temp 37.2 °C (98.9 °F) (Temporal)   Resp 16   Ht 1.651 m (5' 5\")   Wt 79.4 kg (175 lb)   SpO2 98%   BMI 29.12 kg/m²   Physical Exam  Vitals and nursing note reviewed.   Constitutional:       General: She is not in acute distress.     Appearance: Normal appearance. She is well-developed. She is not ill-appearing or toxic-appearing.   HENT:      Head: Normocephalic and atraumatic.      Right Ear: Hearing normal.      Left Ear: Hearing normal.   Cardiovascular:      Rate and Rhythm: Normal rate and regular rhythm.      Heart sounds: Normal heart sounds.   Pulmonary:      Effort: Pulmonary effort is normal.      Breath sounds: Normal breath sounds.   Abdominal:      General: Bowel sounds are normal. There is no distension.      Palpations: Abdomen is soft.      Tenderness: There is abdominal tenderness in the epigastric area. There is no right CVA tenderness, left CVA tenderness, guarding or rebound. Negative signs include Rain's sign.   Musculoskeletal:      Comments: Normal movement in all 4 extremities   Skin:     General: Skin is warm and dry.   Neurological:      Mental Status: She is alert.      Coordination: Coordination normal.   Psychiatric:         Mood and Affect: Mood normal.       Assessment/Plan:   Assessment    1. Acute gastritis without hemorrhage, unspecified gastritis type    2. Constipation, unspecified constipation type    Symptoms and presentation appear consistent with irritation of the stomach as well as some constipation.  No indication for any imaging today and no signs of infection.  No need for antibiotics.  Would recommend trialing over-the-counter symptomatic treatment per 's instructions such as antacid and stool softeners.  Would suggest drinking plenty of water as well as bland diet and bowel rest for the next " few days.  With any new or worsening symptoms as we discussed I would recommend going back in for further evaluation.    Differential diagnosis, natural history, supportive care, and indications for immediate follow-up discussed.   Patient given instructions and understanding of medications and treatment.    If not improving in 3-5 days, F/U with PCP or return to UC if symptoms worsen.  Strict ER precautions given.  Patient agreeable to plan.      Please note that this dictation was created using voice recognition software. I have made every reasonable attempt to correct obvious errors, but I expect that there are errors of grammar and possibly content that I did not discover before finalizing the note.    Hal Ayala PA-C

## 2024-04-17 NOTE — LETTER
April 17, 2024         Patient: Katie Demarco   YOB: 2005   Date of Visit: 4/17/2024           To Whom it May Concern:    Katie Demarco was seen in my clinic on 4/17/2024.  Please excuse her absence Monday through today.    If you have any questions or concerns, please don't hesitate to call.        Sincerely,           Hal Ayala P.A.-C.  Electronically Signed